# Patient Record
Sex: MALE | Race: WHITE | NOT HISPANIC OR LATINO
[De-identification: names, ages, dates, MRNs, and addresses within clinical notes are randomized per-mention and may not be internally consistent; named-entity substitution may affect disease eponyms.]

---

## 2019-07-10 ENCOUNTER — APPOINTMENT (OUTPATIENT)
Dept: CARDIOLOGY | Facility: CLINIC | Age: 64
End: 2019-07-10
Payer: MEDICARE

## 2019-07-10 ENCOUNTER — NON-APPOINTMENT (OUTPATIENT)
Age: 64
End: 2019-07-10

## 2019-07-10 VITALS
RESPIRATION RATE: 14 BRPM | DIASTOLIC BLOOD PRESSURE: 78 MMHG | WEIGHT: 172 LBS | HEIGHT: 70 IN | HEART RATE: 75 BPM | SYSTOLIC BLOOD PRESSURE: 124 MMHG | BODY MASS INDEX: 24.62 KG/M2

## 2019-07-10 DIAGNOSIS — Z83.438 FAMILY HISTORY OF OTHER DISORDER OF LIPOPROTEIN METABOLISM AND OTHER LIPIDEMIA: ICD-10-CM

## 2019-07-10 DIAGNOSIS — Z87.891 PERSONAL HISTORY OF NICOTINE DEPENDENCE: ICD-10-CM

## 2019-07-10 DIAGNOSIS — Z85.828 PERSONAL HISTORY OF OTHER MALIGNANT NEOPLASM OF SKIN: ICD-10-CM

## 2019-07-10 DIAGNOSIS — Z87.898 PERSONAL HISTORY OF OTHER SPECIFIED CONDITIONS: ICD-10-CM

## 2019-07-10 DIAGNOSIS — Z82.49 FAMILY HISTORY OF ISCHEMIC HEART DISEASE AND OTHER DISEASES OF THE CIRCULATORY SYSTEM: ICD-10-CM

## 2019-07-10 DIAGNOSIS — Z86.69 PERSONAL HISTORY OF OTHER DISEASES OF THE NERVOUS SYSTEM AND SENSE ORGANS: ICD-10-CM

## 2019-07-10 DIAGNOSIS — M19.90 UNSPECIFIED OSTEOARTHRITIS, UNSPECIFIED SITE: ICD-10-CM

## 2019-07-10 DIAGNOSIS — Z85.6 PERSONAL HISTORY OF LEUKEMIA: ICD-10-CM

## 2019-07-10 DIAGNOSIS — Z87.39 PERSONAL HISTORY OF OTHER DISEASES OF THE MUSCULOSKELETAL SYSTEM AND CONNECTIVE TISSUE: ICD-10-CM

## 2019-07-10 DIAGNOSIS — Z87.442 PERSONAL HISTORY OF URINARY CALCULI: ICD-10-CM

## 2019-07-10 DIAGNOSIS — Z87.19 PERSONAL HISTORY OF OTHER DISEASES OF THE DIGESTIVE SYSTEM: ICD-10-CM

## 2019-07-10 DIAGNOSIS — Z87.09 PERSONAL HISTORY OF OTHER DISEASES OF THE RESPIRATORY SYSTEM: ICD-10-CM

## 2019-07-10 PROCEDURE — 93000 ELECTROCARDIOGRAM COMPLETE: CPT

## 2019-07-10 PROCEDURE — 99204 OFFICE O/P NEW MOD 45 MIN: CPT

## 2019-07-31 ENCOUNTER — APPOINTMENT (OUTPATIENT)
Dept: CARDIOLOGY | Facility: CLINIC | Age: 64
End: 2019-07-31
Payer: MEDICARE

## 2019-07-31 PROCEDURE — 93306 TTE W/DOPPLER COMPLETE: CPT

## 2019-08-08 ENCOUNTER — APPOINTMENT (OUTPATIENT)
Dept: CARDIOLOGY | Facility: CLINIC | Age: 64
End: 2019-08-08
Payer: MEDICARE

## 2019-08-08 PROCEDURE — A9500: CPT

## 2019-08-08 PROCEDURE — 78452 HT MUSCLE IMAGE SPECT MULT: CPT

## 2019-08-08 PROCEDURE — 93015 CV STRESS TEST SUPVJ I&R: CPT

## 2019-09-05 RX ORDER — KIT FOR THE PREPARATION OF TECHNETIUM TC99M SESTAMIBI 1 MG/5ML
INJECTION, POWDER, LYOPHILIZED, FOR SOLUTION PARENTERAL
Refills: 0 | Status: COMPLETED | OUTPATIENT
Start: 2019-09-05

## 2019-09-05 RX ADMIN — KIT FOR THE PREPARATION OF TECHNETIUM TC99M SESTAMIBI 0: 1 INJECTION, POWDER, LYOPHILIZED, FOR SOLUTION PARENTERAL at 00:00

## 2019-11-15 ENCOUNTER — NON-APPOINTMENT (OUTPATIENT)
Age: 64
End: 2019-11-15

## 2019-11-15 ENCOUNTER — APPOINTMENT (OUTPATIENT)
Dept: CARDIOLOGY | Facility: CLINIC | Age: 64
End: 2019-11-15
Payer: MEDICARE

## 2019-11-15 VITALS
HEIGHT: 70 IN | BODY MASS INDEX: 23.62 KG/M2 | SYSTOLIC BLOOD PRESSURE: 123 MMHG | DIASTOLIC BLOOD PRESSURE: 70 MMHG | RESPIRATION RATE: 16 BRPM | HEART RATE: 72 BPM | WEIGHT: 165 LBS

## 2019-11-15 PROCEDURE — 93000 ELECTROCARDIOGRAM COMPLETE: CPT

## 2019-11-15 PROCEDURE — 99214 OFFICE O/P EST MOD 30 MIN: CPT

## 2019-11-15 NOTE — ASSESSMENT
[FreeTextEntry1] : EKG demonstrating normal sinus rhythm with a moderate rate of 72 and otherwise within normal limits;\par \par Recent nuclear stress test demonstrating good exercise tolerance into stage IV, no symptoms of chest pain, occasional PACs and PVCs were seen. EKG remained negative her ST abnormality. Myocardial perfusion images demonstrated normal perfusion without any evidence of ischemia-i.e. negative test\par \par Recent transthoracic echocardiogram from August 2019 demonstrates normal cardiac chamber sizes, normal systolic function of LV normal ejection fraction of 60% mild AI and TR;\par \par Plan:\par \par No additional cardiac workup indicated at this time\par \par Patient encouraged to return to some moderate physical exercise;\par \par Followup to this office within 6 months or p.r.n.\par \par Continued timely checkups and laboratory blood tests with primary care;;;;

## 2019-11-15 NOTE — REASON FOR VISIT
[Follow-Up - Clinic] : a clinic follow-up of [FreeTextEntry1] : The patient is a 63-year-old retired  who has a history of 9/11 exposure and being treated for CML with Gleevec who had a prior chest pain syndrome and underwent recent cardiac testing.;\par \par Fortunately, he has been doing well from a cardiac standpoint and has had no recent symptoms of chest pain, shortness of breath, palpitations or dizziness;\par

## 2019-11-15 NOTE — PHYSICAL EXAM
[FreeTextEntry1] : Alert, pleasant, thin white male in no acute distress [Normal Conjunctiva] : the conjunctiva exhibited no abnormalities [Eyelids - No Xanthelasma] : the eyelids demonstrated no xanthelasmas [No Oral Pallor] : no oral pallor [Normal Oral Mucosa] : normal oral mucosa [No Oral Cyanosis] : no oral cyanosis [Normal Jugular Venous V Waves Present] : normal jugular venous V waves present [Normal Jugular Venous A Waves Present] : normal jugular venous A waves present [No Jugular Venous Mitchell A Waves] : no jugular venous mitchell A waves [Respiration, Rhythm And Depth] : normal respiratory rhythm and effort [Exaggerated Use Of Accessory Muscles For Inspiration] : no accessory muscle use [Auscultation Breath Sounds / Voice Sounds] : lungs were clear to auscultation bilaterally [Heart Rate And Rhythm] : heart rate and rhythm were normal [Heart Sounds] : normal S1 and S2 [Murmurs] : no murmurs present [Abdomen Soft] : soft [Abdomen Tenderness] : non-tender [Abdomen Mass (___ Cm)] : no abdominal mass palpated [Abnormal Walk] : normal gait [Nail Clubbing] : no clubbing of the fingernails [Gait - Sufficient For Exercise Testing] : the gait was sufficient for exercise testing [Cyanosis, Localized] : no localized cyanosis [Petechial Hemorrhages (___cm)] : no petechial hemorrhages [Skin Color & Pigmentation] : normal skin color and pigmentation [No Venous Stasis] : no venous stasis [] : no rash [Skin Lesions] : no skin lesions [No Skin Ulcers] : no skin ulcer [Oriented To Time, Place, And Person] : oriented to person, place, and time [No Xanthoma] : no  xanthoma was observed [Affect] : the affect was normal [Mood] : the mood was normal [No Anxiety] : not feeling anxious

## 2019-11-15 NOTE — HISTORY OF PRESENT ILLNESS
[FreeTextEntry1] : He is on no cardiac meds at this time;\par \par He had been doing a moderate amount of physical activity and exercise but cut back because of some arthritides especially affecting the hands and wrist areas for which he has had some pain management injections and steroids;\par \par He lost 10 pounds on a low-carb diet to levi off any tendency for early diabetes;;

## 2020-05-05 ENCOUNTER — APPOINTMENT (OUTPATIENT)
Dept: CARDIOLOGY | Facility: CLINIC | Age: 65
End: 2020-05-05
Payer: MEDICARE

## 2020-05-05 PROCEDURE — 99214 OFFICE O/P EST MOD 30 MIN: CPT | Mod: 95

## 2020-05-05 NOTE — ASSESSMENT
[FreeTextEntry1] : In summary this 64-year-old gentleman who has had a stable cardiac pattern only mild valvular insufficiency on last echo negative nuclear stress test from August 2019-and stable pattern with history of CML with his oncologist;\par \par No additional cardiac workup indicated at this time;\par \par \par \par Plan:\par \par Okay to make followup visit within 5-6 months\par \par Patient report any untoward chest symptoms between now and next visit;\par \par Stay safe guidelines for this high risk patient for coronavirus pandemic discussed at length as patient appears to understand and follow guidelines;;\par ;

## 2020-05-05 NOTE — PHYSICAL EXAM
[General Appearance - Well Developed] : well developed [Normal Appearance] : normal appearance [General Appearance - In No Acute Distress] : no acute distress [General Appearance - Well Nourished] : well nourished [] : no respiratory distress [Oriented To Time, Place, And Person] : oriented to person, place, and time [Impaired Insight] : insight and judgment were intact [Affect] : the affect was normal [Memory Recent] : recent memory was not impaired [No Anxiety] : not feeling anxious

## 2020-05-05 NOTE — HISTORY OF PRESENT ILLNESS
[Home] : at home, [unfilled] , at the time of the visit. [Medical Office: ([facility])___] : at the medical office located in  [Patient] : the patient [FreeTextEntry1] : This 64-year-old white male with known history for CML and prior 9/11 exposure, who is a retired  has had a stable cardiac pattern;\par \par Patient underwent cardiac testing this past summer including nuclear stress test and echocardiogram;\par \par He reports he's been without chest pain, shortness of breath, palpitations or dizziness;\par \par Patient has been following up with oncology and has had a "stable report";\par \par Nuclear stress test from August 2020 demonstrated good exercise tolerance into stage IV. No symptoms of chest pain. No hernias. Normal EKG. An myocardial perfusion imaging was normal;\par \par \par \par

## 2020-05-08 ENCOUNTER — APPOINTMENT (OUTPATIENT)
Dept: CARDIOLOGY | Facility: CLINIC | Age: 65
End: 2020-05-08

## 2020-10-30 ENCOUNTER — NON-APPOINTMENT (OUTPATIENT)
Age: 65
End: 2020-10-30

## 2020-10-30 ENCOUNTER — APPOINTMENT (OUTPATIENT)
Dept: CARDIOLOGY | Facility: CLINIC | Age: 65
End: 2020-10-30
Payer: MEDICARE

## 2020-10-30 VITALS
HEIGHT: 70 IN | RESPIRATION RATE: 16 BRPM | HEART RATE: 62 BPM | BODY MASS INDEX: 23.62 KG/M2 | WEIGHT: 165 LBS | TEMPERATURE: 96.6 F | DIASTOLIC BLOOD PRESSURE: 82 MMHG | SYSTOLIC BLOOD PRESSURE: 126 MMHG

## 2020-10-30 PROCEDURE — 99214 OFFICE O/P EST MOD 30 MIN: CPT

## 2020-10-30 PROCEDURE — 93000 ELECTROCARDIOGRAM COMPLETE: CPT

## 2020-10-30 NOTE — PHYSICAL EXAM
[General Appearance - Well Developed] : well developed [Normal Appearance] : normal appearance [General Appearance - Well Nourished] : well nourished [General Appearance - In No Acute Distress] : no acute distress [Normal Conjunctiva] : the conjunctiva exhibited no abnormalities [Normal Oral Mucosa] : normal oral mucosa [Normal Oropharynx] : normal oropharynx [Normal Jugular Venous A Waves Present] : normal jugular venous A waves present [Normal Jugular Venous V Waves Present] : normal jugular venous V waves present [No Jugular Venous Mitchell A Waves] : no jugular venous mitchell A waves [] : no respiratory distress [Respiration, Rhythm And Depth] : normal respiratory rhythm and effort [Auscultation Breath Sounds / Voice Sounds] : lungs were clear to auscultation bilaterally [Heart Rate And Rhythm] : heart rate and rhythm were normal [Heart Sounds] : normal S1 and S2 [Murmurs] : no murmurs present [Edema] : no peripheral edema present [Bowel Sounds] : normal bowel sounds [Abdomen Soft] : soft [Abnormal Walk] : normal gait [Nail Clubbing] : no clubbing of the fingernails [Cyanosis, Localized] : no localized cyanosis [Skin Color & Pigmentation] : normal skin color and pigmentation [Skin Turgor] : normal skin turgor [Oriented To Time, Place, And Person] : oriented to person, place, and time [Impaired Insight] : insight and judgment were intact [Affect] : the affect was normal [No Anxiety] : not feeling anxious

## 2020-10-30 NOTE — ASSESSMENT
[FreeTextEntry1] : EKG 10/30/2020:  The EKG illustrates sinus rhythm, rate of 62 bpm, short [place] syndrome, early R wave transition V1 to V2. \par \par In summary this 64-year-old gentleman who has had a stable cardiac pattern only mild valvular insufficiency on last echo negative nuclear stress test from August 2019-and stable pattern with history of CML with his oncologist;

## 2020-10-30 NOTE — REASON FOR VISIT
[FreeTextEntry1] : MINESH HIGGINS is being seen for a clinic follow-up of. \par \par The patient is a 64-year-old retired  who has a history of 9/11 exposure and being treated for CML with Gleevec (400 mg BID) who had a prior chest pain syndrome.  \par \par Fortunately, he has been doing well from a cardiac standpoint and has had no recent symptoms of chest pain, shortness of breath, palpitations or dizziness.

## 2020-10-30 NOTE — DISCUSSION/SUMMARY
[FreeTextEntry1] : 1).  Patient will complete a Transthoracic Echocardiogram prior to follow up office visit to assess LV systolic function, valvulopathy and for signs of cardiomyopathy.\par \par 2).  Follow up with PCP and Oncologist regarding routine checkups and blood work, have copy faxed to our office. \par \par 3).  Continue with diet and lifestyle modification including low fat and low carbohydrate diet.  He is to implement modest aerobic exercise regimen few days per week.\par \par 4).  Recommend patient report any untoward symptoms. \par \par 5).  Follow up with our office in 5 to 6 months or PRN.

## 2021-02-03 ENCOUNTER — APPOINTMENT (OUTPATIENT)
Dept: CARDIOLOGY | Facility: CLINIC | Age: 66
End: 2021-02-03
Payer: MEDICARE

## 2021-02-03 PROCEDURE — 93306 TTE W/DOPPLER COMPLETE: CPT

## 2021-03-12 ENCOUNTER — APPOINTMENT (OUTPATIENT)
Dept: CARDIOLOGY | Facility: CLINIC | Age: 66
End: 2021-03-12
Payer: MEDICARE

## 2021-03-12 ENCOUNTER — NON-APPOINTMENT (OUTPATIENT)
Age: 66
End: 2021-03-12

## 2021-03-12 VITALS
BODY MASS INDEX: 24.34 KG/M2 | HEIGHT: 70 IN | TEMPERATURE: 97.3 F | WEIGHT: 170 LBS | HEART RATE: 79 BPM | RESPIRATION RATE: 16 BRPM | DIASTOLIC BLOOD PRESSURE: 80 MMHG | SYSTOLIC BLOOD PRESSURE: 128 MMHG

## 2021-03-12 DIAGNOSIS — Z00.00 ENCOUNTER FOR GENERAL ADULT MEDICAL EXAMINATION W/OUT ABNORMAL FINDINGS: ICD-10-CM

## 2021-03-12 PROCEDURE — 99214 OFFICE O/P EST MOD 30 MIN: CPT

## 2021-03-12 PROCEDURE — 93000 ELECTROCARDIOGRAM COMPLETE: CPT

## 2021-03-12 NOTE — REASON FOR VISIT
[Follow-Up - Clinic] : a clinic follow-up of [FreeTextEntry1] : The patient is a 65-year-old white male with known history for CML currently on Gleevec medication who has a prior history of chest pain syndrome but doing well lately from a cardiac standpoint. He returns to the office for general cardiac checkup today;\par \par Patient reports that he has been without symptoms for chest pain, shortness of breath, palpitations, dizziness or syncope;\par

## 2021-03-12 NOTE — HISTORY OF PRESENT ILLNESS
[FreeTextEntry1] : He states he completed his vaccine for Covid 19; he has been doing a modest amount of physical exercise; \par \par He currently is on no cardiac meds;\par \par Last nuclear stress test was August 2019 and was negative for ischemia on the myocardial perfusion images;

## 2021-03-12 NOTE — ASSESSMENT
[FreeTextEntry1] : EKG demonstrating normal sinus rhythm at a rate of 79. Early R-wave transition V1 to V2. No acute changes\par \par In summary this 65-year-old gentleman has had a stable cardiac pattern with a remote history for chest pain in the past but negative nuclear stress test and echocardiogram ( showing preserved cardiac chamber sizes and normal systolic function of left ventricle and mild AI and TR);\par \par Plan:\par \par No additional cardiac workup indicated at this time;;Followup in this office within 6 months or p.r.n.;\par \par Continued timely checkups and laboratory blood tests with primary care encouraged;

## 2021-03-12 NOTE — PHYSICAL EXAM
[General Appearance - Well Developed] : well developed [Normal Appearance] : normal appearance [General Appearance - Well Nourished] : well nourished [General Appearance - In No Acute Distress] : no acute distress [Normal Conjunctiva] : the conjunctiva exhibited no abnormalities [Eyelids - No Xanthelasma] : the eyelids demonstrated no xanthelasmas [Normal Oral Mucosa] : normal oral mucosa [No Oral Pallor] : no oral pallor [No Oral Cyanosis] : no oral cyanosis [Normal Jugular Venous A Waves Present] : normal jugular venous A waves present [Normal Jugular Venous V Waves Present] : normal jugular venous V waves present [No Jugular Venous Mitchell A Waves] : no jugular venous mitchell A waves [Heart Rate And Rhythm] : heart rate and rhythm were normal [Heart Sounds] : normal S1 and S2 [Murmurs] : no murmurs present [Abdomen Soft] : soft [Abdomen Tenderness] : non-tender [Abdomen Mass (___ Cm)] : no abdominal mass palpated [Abnormal Walk] : normal gait [Gait - Sufficient For Exercise Testing] : the gait was sufficient for exercise testing [Nail Clubbing] : no clubbing of the fingernails [Cyanosis, Localized] : no localized cyanosis [Petechial Hemorrhages (___cm)] : no petechial hemorrhages [Skin Color & Pigmentation] : normal skin color and pigmentation [] : no rash [No Venous Stasis] : no venous stasis [Skin Lesions] : no skin lesions [No Skin Ulcers] : no skin ulcer [No Xanthoma] : no  xanthoma was observed [Oriented To Time, Place, And Person] : oriented to person, place, and time [Impaired Insight] : insight and judgment were intact [Affect] : the affect was normal [Memory Recent] : recent memory was not impaired [No Anxiety] : not feeling anxious

## 2021-08-06 ENCOUNTER — APPOINTMENT (OUTPATIENT)
Dept: CARDIOLOGY | Facility: CLINIC | Age: 66
End: 2021-08-06

## 2021-09-08 ENCOUNTER — NON-APPOINTMENT (OUTPATIENT)
Age: 66
End: 2021-09-08

## 2021-09-08 ENCOUNTER — APPOINTMENT (OUTPATIENT)
Dept: ORTHOPEDIC SURGERY | Facility: CLINIC | Age: 66
End: 2021-09-08
Payer: MEDICARE

## 2021-09-08 VITALS
WEIGHT: 173 LBS | DIASTOLIC BLOOD PRESSURE: 76 MMHG | HEART RATE: 77 BPM | HEIGHT: 70 IN | SYSTOLIC BLOOD PRESSURE: 132 MMHG | BODY MASS INDEX: 24.77 KG/M2

## 2021-09-08 PROCEDURE — 73030 X-RAY EXAM OF SHOULDER: CPT | Mod: LT

## 2021-09-08 PROCEDURE — 99204 OFFICE O/P NEW MOD 45 MIN: CPT

## 2021-09-08 NOTE — DISCUSSION/SUMMARY
[de-identified] : Assessment: 65-year-old male with left shoulder pain secondary to rotator cuff tendinitis and bursitis\par \par Plan: I had a long discussion with the patient today regarding the nature of their diagnosis and treatment plan. We discussed the risks and benefits of no treatment as well as nonoperative and operative treatments.  I reviewed the patient's x-rays today with him in the office which are negative for any acute pathology.  At this time I recommend conservative treatment of the patient's condition with modalities including rest, ice, heat, anti-inflammatory medications, activity modifications, and home stretching and strengthening exercises daily.  A prescription for meloxicam was sent to the patient's pharmacy today.  I discussed with the patient the risks and benefits associated with NSAID use.  A referral for physical therapy was provided to begin working on exercises for his shoulders to help improve his pain and his function.  Recommend follow-up in 8 weeks for repeat clinical evaluation.  The patient continues to have pain at that time we will consider either an MRI or an injection.\par \par The patient verbalizes their understanding and agrees with the plan.  All questions were answered to their satisfaction.

## 2021-09-08 NOTE — PHYSICAL EXAM
[de-identified] : General:\par Awake, alert, no acute distress, Patient was cooperative and appropriate during the examination.\par \par The patient is of normal weight for height and age.\par \par Walks without an antalgic gait.\par \par Full, painless range of motion of the neck and back.\par \par Exam of the bilateral lower extremities is intact and symmetric with regards to dermatologic, vascular, and neurologic exam. Bilateral lower extremity sensation is grossly intact to light touch in the DP/SP/T/S/S nerve distributions. Intact DF/PF/EHL. BIlateral lower extremities warm and well-perfused with brisk capillary refill.\par \par \par Pulmonary:\par Regular, nonlabored breathing\par \par Abdomen:\par Soft, nontender, nondistended.\par \par Lymphatic:\par No evidence of axillary lymphadenopathy\par \par Left shoulder Exam:\par Physical exam of the shoulder demonstrates normal skin without signs of skin changes or abnormalities. No erythema, warmth, or joint effusion appreciated. \par \par Sensation intact light touch C5-T1\par Palpable radial pulse\par Radial/ulnar/median/axillary/musculocutaneous/AIN/PIN nerves grossly intact\par \par Range of motion:\par Forward Flexion: 175\par Abduction: 150\par External Rotation: 45\par Internal Rotation: L1\par \par Palpation:\par Not tender to palpation over the glenohumeral joint\par Moderately tender palpation over the rotator cuff insertion on the greater tuberosity\par Not tender to palpation over the AC joint\par Mildly tender to palpation of the biceps tendon/bicipital groove\par \par Strength testing:\par Supraspinatus: 5/5\par Infraspinatus: 5/5\par Subscapularis: 5/5\par \par Special test:\par Empty can test positive\par Jacome impingement test positive\par Speeds test negative\par Melville's test negative\par Lift-off test negative\par Bell-press test negative\par Cross-arm adduction test negative\par \par  [de-identified] : X-rays including 4 views of the left shoulder were obtained in the office and reviewed the patient today.  There is no acute fracture or dislocation.  There is no significant arthritis.

## 2021-09-08 NOTE — HISTORY OF PRESENT ILLNESS
[de-identified] : Ankur is a pleasant 65-year-old left-hand-dominant male presents to the office today complaining of left shoulder pain.  The patient states that his pain began several months ago but he denies any history of trauma.  The pain is activity related and alleviated by rest.  It hurts to reach overhead or behind him.  Hurts when he sleeps on his left side at night.  He is not taking any for pain.  He has had no therapy or injections before.  The patient denies any fevers, chills, sweats, recent illnesses, numbness, tingling, weakness, or pain elsewhere at this time.

## 2021-09-27 ENCOUNTER — RX RENEWAL (OUTPATIENT)
Age: 66
End: 2021-09-27

## 2021-11-03 ENCOUNTER — APPOINTMENT (OUTPATIENT)
Dept: ORTHOPEDIC SURGERY | Facility: CLINIC | Age: 66
End: 2021-11-03

## 2022-03-09 NOTE — HISTORY OF PRESENT ILLNESS
[FreeTextEntry1] : He is on NO cardiac meds at this time;\par \par Patient reports eating a well balanced diet low in fats and carbohydrate but admits he has not been participating in any formal aerobic exercise regimen;\par \par He underwent Nuclear Stress test August 2019 which demonstrated good exercise tolerance into stage IV.  No symptoms of chest pain. Normal EKG.  Myocardial perfusion imaging was normal; Yes

## 2024-01-29 ENCOUNTER — APPOINTMENT (OUTPATIENT)
Dept: CARDIOLOGY | Facility: CLINIC | Age: 69
End: 2024-01-29
Payer: MEDICARE

## 2024-01-29 VITALS
BODY MASS INDEX: 24.2 KG/M2 | HEIGHT: 70 IN | DIASTOLIC BLOOD PRESSURE: 84 MMHG | WEIGHT: 169 LBS | HEART RATE: 62 BPM | RESPIRATION RATE: 16 BRPM | SYSTOLIC BLOOD PRESSURE: 152 MMHG

## 2024-01-29 DIAGNOSIS — M25.512 PAIN IN LEFT SHOULDER: ICD-10-CM

## 2024-01-29 PROCEDURE — 93000 ELECTROCARDIOGRAM COMPLETE: CPT

## 2024-01-29 PROCEDURE — 99214 OFFICE O/P EST MOD 30 MIN: CPT

## 2024-01-29 RX ORDER — IMATINIB MESYLATE 100 MG
CAPSULE ORAL
Refills: 0 | Status: DISCONTINUED | COMMUNITY
End: 2024-01-29

## 2024-01-29 RX ORDER — MELOXICAM 15 MG/1
15 TABLET ORAL
Qty: 21 | Refills: 0 | Status: DISCONTINUED | COMMUNITY
Start: 2021-09-08 | End: 2024-01-29

## 2024-01-29 RX ORDER — RABEPRAZOLE SODIUM 20 MG/1
20 TABLET, DELAYED RELEASE ORAL
Refills: 0 | Status: DISCONTINUED | COMMUNITY
End: 2024-01-29

## 2024-01-29 NOTE — ASSESSMENT
[FreeTextEntry1] : EKG demonstrating normal sinus rhythm at a rate of 62. Early R-wave transition V1 to V2. No acute changes  In summary this 68-year-old gentleman with prior history for chest pain in the past ; echocardiogram ( showing preserved cardiac chamber sizes and normal systolic function of left ventricle and mild AI and TR); Stress test some 5 years ago was negative at that time;  Recently in remission off of medication for CML; Mild systolic hypertension noted today and patient reports that he did have heavily salted food watching football game last night; but reports usually blood pressure is better;  Plan:  Recommend patient schedule nuclear stress test sometime in the near future to rule out any significant obstructive coronary disease and clear patient to exercise;  Patient to report any untoward chest symptoms between now and next visit;  Agree with continuing statin medication with watch for LDL cholesterol at levels of 100 or well below if possible;  Continue regular checkups and laboratory blood tests including lipid panel with primary care;  Periodic check on blood pressure readings and patient to increase H2O today and be more cognizant of low-sodium diet;

## 2024-01-29 NOTE — PHYSICAL EXAM
[General Appearance - Well Developed] : well developed [Normal Appearance] : normal appearance [General Appearance - In No Acute Distress] : no acute distress [General Appearance - Well Nourished] : well nourished [Normal Conjunctiva] : the conjunctiva exhibited no abnormalities [Eyelids - No Xanthelasma] : the eyelids demonstrated no xanthelasmas [Normal Oral Mucosa] : normal oral mucosa [No Oral Pallor] : no oral pallor [No Oral Cyanosis] : no oral cyanosis [Normal Jugular Venous A Waves Present] : normal jugular venous A waves present [Normal Jugular Venous V Waves Present] : normal jugular venous V waves present [No Jugular Venous Mitchell A Waves] : no jugular venous mitchell A waves [Heart Rate And Rhythm] : heart rate and rhythm were normal [Heart Sounds] : normal S1 and S2 [Murmurs] : no murmurs present [Abdomen Soft] : soft [Abdomen Tenderness] : non-tender [Abdomen Mass (___ Cm)] : no abdominal mass palpated [Abnormal Walk] : normal gait [Gait - Sufficient For Exercise Testing] : the gait was sufficient for exercise testing [Nail Clubbing] : no clubbing of the fingernails [Cyanosis, Localized] : no localized cyanosis [Petechial Hemorrhages (___cm)] : no petechial hemorrhages [Skin Color & Pigmentation] : normal skin color and pigmentation [] : no rash [No Venous Stasis] : no venous stasis [Skin Lesions] : no skin lesions [No Skin Ulcers] : no skin ulcer [No Xanthoma] : no  xanthoma was observed [Oriented To Time, Place, And Person] : oriented to person, place, and time [Impaired Insight] : insight and judgment were intact [Affect] : the affect was normal [Memory Recent] : recent memory was not impaired [No Anxiety] : not feeling anxious

## 2024-01-29 NOTE — HISTORY OF PRESENT ILLNESS
[FreeTextEntry1] : He was recommended to start rosuvastatin 10 mg daily with primary care because of the findings on calcium scoring and to lower his serum cholesterol;   He would like to get back into a regular exercise program and states over the past few years he is slacked off;

## 2024-01-29 NOTE — REASON FOR VISIT
[Follow-Up - Clinic] : a clinic follow-up of [FreeTextEntry1] : The patient is a 68-year-old white male with known history for CML who was previously on Gleevec, and then ultimately switched to Sprycel for a few years and then recently because of evidence of "remission", he was taken off of the Sprycel and just being watched clinically and followed by oncology;  Recently, he was recommended to undergo a CT calcium scoring to rule out any significant risk of coronary artery disease.  The patient had some intermittent chest pain at times in the past; The CT calcium score came out fairly low with a total score of 33.5 in the LAD vessel and 0 on the left main, circumflex, and RCA vessels; (low);  He returns to the office today to assess those findings and in conjunction with positive family history for cerebral stroke for a younger brother and his father who has been treated for atrial fibrillation.;  Today, he denies any significant chest pain, shortness of breath, palpitations or dizziness;

## 2024-04-23 ENCOUNTER — APPOINTMENT (OUTPATIENT)
Dept: CARDIOLOGY | Facility: CLINIC | Age: 69
End: 2024-04-23
Payer: MEDICARE

## 2024-04-23 PROCEDURE — 78452 HT MUSCLE IMAGE SPECT MULT: CPT

## 2024-04-23 PROCEDURE — A9500: CPT

## 2024-04-23 PROCEDURE — 93015 CV STRESS TEST SUPVJ I&R: CPT

## 2024-05-03 ENCOUNTER — NON-APPOINTMENT (OUTPATIENT)
Age: 69
End: 2024-05-03

## 2024-05-03 ENCOUNTER — APPOINTMENT (OUTPATIENT)
Dept: CARDIOLOGY | Facility: CLINIC | Age: 69
End: 2024-05-03
Payer: MEDICARE

## 2024-05-03 VITALS
HEIGHT: 70 IN | DIASTOLIC BLOOD PRESSURE: 78 MMHG | SYSTOLIC BLOOD PRESSURE: 140 MMHG | RESPIRATION RATE: 16 BRPM | HEART RATE: 63 BPM | BODY MASS INDEX: 25.77 KG/M2 | WEIGHT: 180 LBS

## 2024-05-03 DIAGNOSIS — R07.9 CHEST PAIN, UNSPECIFIED: ICD-10-CM

## 2024-05-03 DIAGNOSIS — R94.39 ABNORMAL RESULT OF OTHER CARDIOVASCULAR FUNCTION STUDY: ICD-10-CM

## 2024-05-03 PROCEDURE — 93000 ELECTROCARDIOGRAM COMPLETE: CPT

## 2024-05-03 PROCEDURE — 99214 OFFICE O/P EST MOD 30 MIN: CPT

## 2024-05-03 NOTE — REASON FOR VISIT
[Follow-Up - Clinic] : a clinic follow-up of [FreeTextEntry1] : The patient is a 68-year-old white male with known history for CML who was previously on Gleevec, and then ultimately switched to Sprycel for a few years and then recently because of evidence of "remission", he was taken off of the Sprycel and just being watched clinically and followed by oncology;  Recently, he was recommended to undergo a CT calcium scoring to rule out any significant risk of coronary artery disease.  The patient had some intermittent chest pain at times in the past; The CT calcium score came out fairly low with a total score of 33.5 in the LAD vessel and 0 on the left main, circumflex, and RCA vessels; (low);  He returns to the office today to review the results from follow up nuclear stress testing in light of CT calcium score, symptoms, and risk factors.    To assess those findings and in conjunction with positive family history for cerebral stroke for a younger brother and his father who has been treated for atrial fibrillation.;  Today, he continues to experience occasional exertional chest discomfort and increased dyspnea on exertion if he really pushes himself. Otherwise, he denies any significant diaphoresis, orthopnea, PND, palpitations or dizziness;

## 2024-05-03 NOTE — HISTORY OF PRESENT ILLNESS
[FreeTextEntry1] : He was recommended to start rosuvastatin 10 mg daily with primary care because of the findings on calcium scoring and to lower his serum cholesterol.  He was also started on Valsartan 160 mg daily due to uncontrolled hypertension these last few months.  Admits he forgot to take his medication this morning and today's blood pressure is 140/80.    He would like to get back into a regular exercise program and states over the past few years he is slacked off;  Most recent Nuclear Stress Test (4/23/2024):  The myocardial perfusion imaging was abnormal, with small-sized, moderate defect in the apical and apical inferior walls that are reversible consistent with ischemia. There were no ischemic ST segment changes on EKG. The findings are now consistent with ischemia when compared to prior study dated (2019); ie- positive study.

## 2024-05-03 NOTE — PHYSICAL EXAM
[General Appearance - Well Developed] : well developed [Normal Appearance] : normal appearance [General Appearance - Well Nourished] : well nourished [General Appearance - In No Acute Distress] : no acute distress [Normal Conjunctiva] : the conjunctiva exhibited no abnormalities [Eyelids - No Xanthelasma] : the eyelids demonstrated no xanthelasmas [Normal Oral Mucosa] : normal oral mucosa [No Oral Pallor] : no oral pallor [No Oral Cyanosis] : no oral cyanosis [Normal Jugular Venous A Waves Present] : normal jugular venous A waves present [Normal Jugular Venous V Waves Present] : normal jugular venous V waves present [No Jugular Venous Mitchell A Waves] : no jugular venous mitchell A waves [Heart Rate And Rhythm] : heart rate and rhythm were normal [Heart Sounds] : normal S1 and S2 [Murmurs] : no murmurs present [Abdomen Soft] : soft [Abdomen Tenderness] : non-tender [Abdomen Mass (___ Cm)] : no abdominal mass palpated [Abnormal Walk] : normal gait [Gait - Sufficient For Exercise Testing] : the gait was sufficient for exercise testing [Nail Clubbing] : no clubbing of the fingernails [Cyanosis, Localized] : no localized cyanosis [Petechial Hemorrhages (___cm)] : no petechial hemorrhages [Skin Color & Pigmentation] : normal skin color and pigmentation [No Venous Stasis] : no venous stasis [] : no rash [Skin Lesions] : no skin lesions [No Skin Ulcers] : no skin ulcer [No Xanthoma] : no  xanthoma was observed [Oriented To Time, Place, And Person] : oriented to person, place, and time [Impaired Insight] : insight and judgment were intact [Affect] : the affect was normal [Memory Recent] : recent memory was not impaired [No Anxiety] : not feeling anxious

## 2024-05-03 NOTE — ASSESSMENT
[FreeTextEntry1] : EKG demonstrating normal sinus rhythm at a rate of 63. Slight ST elevations inferior leads.   In summary this 68-year-old gentleman with prior history for chest pain in the past that he continues to notice with some worsening MACHADO; echocardiogram ( showing preserved cardiac chamber sizes and normal systolic function of left ventricle and mild AI and TR); Stress test some 5 years ago was negative at that time;  Recently in remission off of medication for CML; Mild systolic hypertension noted today but admits to not taking his Valsartan 160 mg this morning.  Most recent nuclear stress test positive for reversible ischemia which appears worse than his prior study dated back in (2019).      Plan:  Recommend patient undergo a left heart catheterization in the near future to rule out any significant obstructive coronary disease and clear patient to exercise;  Patient to report any untoward chest symptoms between now and next visit;  Agree with continuing statin medication with watch for LDL cholesterol at levels of 100 or well below if possible;  Continue regular checkups and laboratory blood tests including lipid panel with primary care;  Periodic check on blood pressure readings and patient to increase H2O today and be more cognizant of low-sodium diet.  Continue taking Valsartan 160 mg daily as directed.   Follow up with our office within a few weeks status post cardiac catheterization or PRN.

## 2024-05-22 ENCOUNTER — OUTPATIENT (OUTPATIENT)
Dept: OUTPATIENT SERVICES | Facility: HOSPITAL | Age: 69
LOS: 1 days | End: 2024-05-22
Payer: MEDICARE

## 2024-05-22 VITALS
OXYGEN SATURATION: 98 % | HEIGHT: 70 IN | DIASTOLIC BLOOD PRESSURE: 84 MMHG | HEART RATE: 84 BPM | WEIGHT: 178.57 LBS | SYSTOLIC BLOOD PRESSURE: 142 MMHG | RESPIRATION RATE: 16 BRPM | TEMPERATURE: 97 F

## 2024-05-22 DIAGNOSIS — Z01.818 ENCOUNTER FOR OTHER PREPROCEDURAL EXAMINATION: ICD-10-CM

## 2024-05-22 LAB
ALBUMIN SERPL ELPH-MCNC: 4.4 G/DL — SIGNIFICANT CHANGE UP (ref 3.3–5.2)
ALP SERPL-CCNC: 100 U/L — SIGNIFICANT CHANGE UP (ref 40–120)
ALT FLD-CCNC: 23 U/L — SIGNIFICANT CHANGE UP
ANION GAP SERPL CALC-SCNC: 11 MMOL/L — SIGNIFICANT CHANGE UP (ref 5–17)
APTT BLD: 30.1 SEC — SIGNIFICANT CHANGE UP (ref 24.5–35.6)
AST SERPL-CCNC: 25 U/L — SIGNIFICANT CHANGE UP
BASOPHILS # BLD AUTO: 0.03 K/UL — SIGNIFICANT CHANGE UP (ref 0–0.2)
BASOPHILS NFR BLD AUTO: 0.7 % — SIGNIFICANT CHANGE UP (ref 0–2)
BILIRUB SERPL-MCNC: 0.6 MG/DL — SIGNIFICANT CHANGE UP (ref 0.4–2)
BLD GP AB SCN SERPL QL: SIGNIFICANT CHANGE UP
BUN SERPL-MCNC: 13.9 MG/DL — SIGNIFICANT CHANGE UP (ref 8–20)
CALCIUM SERPL-MCNC: 9.2 MG/DL — SIGNIFICANT CHANGE UP (ref 8.4–10.5)
CHLORIDE SERPL-SCNC: 101 MMOL/L — SIGNIFICANT CHANGE UP (ref 96–108)
CO2 SERPL-SCNC: 24 MMOL/L — SIGNIFICANT CHANGE UP (ref 22–29)
CREAT SERPL-MCNC: 1.02 MG/DL — SIGNIFICANT CHANGE UP (ref 0.5–1.3)
EGFR: 81 ML/MIN/1.73M2 — SIGNIFICANT CHANGE UP
EOSINOPHIL # BLD AUTO: 0.16 K/UL — SIGNIFICANT CHANGE UP (ref 0–0.5)
EOSINOPHIL NFR BLD AUTO: 3.5 % — SIGNIFICANT CHANGE UP (ref 0–6)
GLUCOSE SERPL-MCNC: 115 MG/DL — HIGH (ref 70–99)
HCT VFR BLD CALC: 44.9 % — SIGNIFICANT CHANGE UP (ref 39–50)
HGB BLD-MCNC: 15.4 G/DL — SIGNIFICANT CHANGE UP (ref 13–17)
IMM GRANULOCYTES NFR BLD AUTO: 0.4 % — SIGNIFICANT CHANGE UP (ref 0–0.9)
INR BLD: 0.93 RATIO — SIGNIFICANT CHANGE UP (ref 0.85–1.18)
LYMPHOCYTES # BLD AUTO: 1.07 K/UL — SIGNIFICANT CHANGE UP (ref 1–3.3)
LYMPHOCYTES # BLD AUTO: 23.6 % — SIGNIFICANT CHANGE UP (ref 13–44)
MAGNESIUM SERPL-MCNC: 2.2 MG/DL — SIGNIFICANT CHANGE UP (ref 1.6–2.6)
MCHC RBC-ENTMCNC: 31.6 PG — SIGNIFICANT CHANGE UP (ref 27–34)
MCHC RBC-ENTMCNC: 34.3 GM/DL — SIGNIFICANT CHANGE UP (ref 32–36)
MCV RBC AUTO: 92 FL — SIGNIFICANT CHANGE UP (ref 80–100)
MONOCYTES # BLD AUTO: 0.68 K/UL — SIGNIFICANT CHANGE UP (ref 0–0.9)
MONOCYTES NFR BLD AUTO: 15 % — HIGH (ref 2–14)
NEUTROPHILS # BLD AUTO: 2.57 K/UL — SIGNIFICANT CHANGE UP (ref 1.8–7.4)
NEUTROPHILS NFR BLD AUTO: 56.8 % — SIGNIFICANT CHANGE UP (ref 43–77)
PLATELET # BLD AUTO: 229 K/UL — SIGNIFICANT CHANGE UP (ref 150–400)
POTASSIUM SERPL-MCNC: 4.2 MMOL/L — SIGNIFICANT CHANGE UP (ref 3.5–5.3)
POTASSIUM SERPL-SCNC: 4.2 MMOL/L — SIGNIFICANT CHANGE UP (ref 3.5–5.3)
PROT SERPL-MCNC: 6.9 G/DL — SIGNIFICANT CHANGE UP (ref 6.6–8.7)
PROTHROM AB SERPL-ACNC: 10.3 SEC — SIGNIFICANT CHANGE UP (ref 9.5–13)
RBC # BLD: 4.88 M/UL — SIGNIFICANT CHANGE UP (ref 4.2–5.8)
RBC # FLD: 12.5 % — SIGNIFICANT CHANGE UP (ref 10.3–14.5)
SODIUM SERPL-SCNC: 136 MMOL/L — SIGNIFICANT CHANGE UP (ref 135–145)
WBC # BLD: 4.53 K/UL — SIGNIFICANT CHANGE UP (ref 3.8–10.5)
WBC # FLD AUTO: 4.53 K/UL — SIGNIFICANT CHANGE UP (ref 3.8–10.5)

## 2024-05-22 PROCEDURE — 85610 PROTHROMBIN TIME: CPT

## 2024-05-22 PROCEDURE — 85025 COMPLETE CBC W/AUTO DIFF WBC: CPT

## 2024-05-22 PROCEDURE — 83735 ASSAY OF MAGNESIUM: CPT

## 2024-05-22 PROCEDURE — 86850 RBC ANTIBODY SCREEN: CPT

## 2024-05-22 PROCEDURE — 93005 ELECTROCARDIOGRAM TRACING: CPT

## 2024-05-22 PROCEDURE — 86901 BLOOD TYPING SEROLOGIC RH(D): CPT

## 2024-05-22 PROCEDURE — 80053 COMPREHEN METABOLIC PANEL: CPT

## 2024-05-22 PROCEDURE — 36415 COLL VENOUS BLD VENIPUNCTURE: CPT

## 2024-05-22 PROCEDURE — 93010 ELECTROCARDIOGRAM REPORT: CPT

## 2024-05-22 PROCEDURE — G0463: CPT

## 2024-05-22 PROCEDURE — 86900 BLOOD TYPING SEROLOGIC ABO: CPT

## 2024-05-22 PROCEDURE — 85730 THROMBOPLASTIN TIME PARTIAL: CPT

## 2024-05-22 NOTE — H&P PST ADULT - ASSESSMENT
Risk Assessments:  ASA:  Mallampati:  Creat:   GFR:   BRA:      Indication:     Coronary Anatomy:     Plan:    -plan for *** via ***  -preferred access:   -confirmed appropriate NPO duration  -ECG and Labs reviewed  -Aspirin 81mg po pre-cath  -Normal Saline 0.9%  250ml/hr IV: pre procedure ANA ppx   -procedure discussed with patient; risks and benefits explained, questions answered  -consent obtained by attending  Risk Assessments:  ASA:  Mallampati:  Creat:   GFR:   BRA:      Plan: The patient is a 68-year-old white male with known history for CML in remission, GERD, and mild CAD on recent CCTA. He c/o intermittent progressive MACHADO and chest pain with abnormal stress test concerning for apical inferior ischemia. Now present to presurgical testing for upcoming LHC on 5/29 with Dr. Booth to further assess CAD.        -plan for LHC   -preferred access: RRA  -confirmed appropriate NPO duration  -ECG and Labs reviewed  -Aspirin 81mg po pre-cath  -Normal Saline 0.9%  250ml/hr IV: pre procedure ANA ppx   -procedure discussed with patient; risks and benefits explained, questions answered  -consent obtained by attending  Risk Assessments:  ASA: 2  Mallampati: 2  Creat:   GFR:   BRA:      Plan: The patient is a 68-year-old white male with known history for CML in remission, GERD, and mild CAD on recent CCTA. He c/o intermittent progressive MACHADO and chest pain with abnormal stress test concerning for apical inferior ischemia. Now present to presurgical testing for upcoming LHC on 5/29 with Dr. Booth to further assess CAD.        -plan for LHC   -preferred access: RRA  -confirmed appropriate NPO duration  -ECG and Labs reviewed  -Aspirin 81mg po pre-cath  -Normal Saline 0.9%  250ml/hr IV: pre procedure ANA ppx   -procedure discussed with patient; risks and benefits explained, questions answered  -consent obtained by attending  Risk Assessments:  ASA: 2  Mallampati: 2  Creat: 1.02  GFR: 81  BRA: 1.0%      Plan: The patient is a 68-year-old white male with known history for CML in remission, GERD, and mild CAD on recent CCTA. He c/o intermittent progressive MACHADO and chest pain with abnormal stress test concerning for apical inferior ischemia. Now present to presurgical testing for upcoming LHC on 5/29 with Dr. Booth to further assess CAD.        -plan for [facility]   -preferred access: RRA  -confirmed appropriate NPO duration  -ECG and Labs reviewed  -Aspirin 81mg po pre-cath  -Normal Saline 0.9%  250ml/hr IV: pre procedure ANA ppx   -procedure discussed with patient; risks and benefits explained, questions answered  -consent obtained by attending

## 2024-05-22 NOTE — H&P PST ADULT - HISTORY OF PRESENT ILLNESS
HPI: The patient is a 68-year-old white male with known history for CML in remission, GERD, and mild CAD on recent CCTA. He c/o intermittent progressive MACHADO and chest pain with abnormal stress test concerning for apical inferior ischemia. Now present to presurgical testing for upcoming [facility] on 5/29 with Dr. Booth to further assess CAD.    Summary   The CT calcium score came out fairly low with a total score of 33.5 in the LAD vessel and 0 on the left main, circumflex, and RCA vessels; (low);  ?  Nuclear Stress Test (4/23/2024): The myocardial perfusion imaging was abnormal, with small-sized, moderate defect in the apical and apical inferior walls that are reversible consistent with ischemia. There were no ischemic ST segment changes on EKG. The findings are now consistent with ischemia when compared to prior study dated (2019); ie- positive study.    Symptoms:        Angina (Class):        Ischemic Symptoms:     Heart Failure:        Systolic/Diastolic/Combined:        NYHA Class (within 2 weeks):     Assessment of LVEF (Must be within 6 months):       EF:        Assessed by:        Date:     Echo (Date, Findings):     Prior Cardiac Interventions:       PCI's (Date, Stents, Vessels):        CABG (Date, Grafts):     Noninvasive Testing:   Stress Test: Date:        Protocol:        Duration of Exercise:        Symptoms:        EKG Changes:        DTS:        Myocardial Imaging:        Risk Assessment (Low, Medium, High):       Antianginal Therapies:        Beta Blockers:         Calcium Channel Blockers:        Long Acting Nitrates:        Ranexa:       Associated Risk Factors:        Fraility Assessment: N/A (mild, moderate, severe)       Cerebrovascular Disease: N/A       Chronic Lung Disease: N/A       Peripheral Arterial Disease: N/A       Chronic Kidney Disease (if yes, what is GFR): N/A       Uncontrolled Diabetes (if yes, what is HgbA1C or FBS): N/A       Poorly Controlled Hypertension (if yes, what is SBP): N/A       Morbid Obesity (if yes, what is BMI): N/A       History of Recent Ventricular Arrhythmia: N/A       Inability to Ambulate Safely: N/A       Need for Therapeutic Anticoagulation: N/A       Antiplatelet or Contrast Allergy: N/A    Social History:        Marital:         Tobacco:        ETOH:        Caffeine:     ROS:  CONSTITUTIONAL: No weakness, fevers or chills  EYES/ENT: No visual changes;  No vertigo or throat pain   NECK: No pain or stiffness  RESPIRATORY: No cough, wheezing, hemoptysis; No shortness of breath  CARDIOVASCULAR: No chest pain or palpitations  GASTROINTESTINAL: No abdominal or epigastric pain. No nausea, vomiting, or hematemesis; No diarrhea or constipation. No melena or hematochezia.  GENITOURINARY: No dysuria, frequency or hematuria  NEUROLOGICAL: No numbness or weakness  SKIN: No itching, burning, rashes, or lesions   All other review of systems is negative unless indicated above    PHYSICAL EXAM:    Vital Signs Last 24 Hrs  T(C): --  T(F): --  HR: --  BP: --  BP(mean): --  RR: --  SpO2: --        GENERAL: Pt lying comfortably, NAD.  ENMT: PERRL, +EOMI.  NECK: soft, Supple, No JVD,   CHEST/LUNG: Clear to auscultatation bilaterally; No wheezing.  HEART: S1S2+, Regular rate and rhythm; No murmurs.  ABDOMEN: Soft, Nontender, Nondistended; Bowel sounds present.  MUSCULOSKELETAL: Normal range of motion.  SKIN: No rashes or lesions.  NEURO: AAOX3, no focal deficits, no motor r sensory loss.  PSYCH: normal mood.  VASCULAR:   Radial +2 R/+2 L  Femoral +2 R/+2 L  PT +2 R/+2 L  DP +2 R/+2 L    EKG:            HPI: The patient is a 68-year-old white male with known history for CML in remission, GERD, and mild CAD on recent CCTA. He c/o intermittent progressive MACHADO and chest pain with abnormal stress test concerning for apical inferior ischemia. Now present to presurgical testing for upcoming [facility] on 5/29 with Dr. Booth to further assess CAD.    Summary   The CT calcium score came out fairly low with a total score of 33.5 in the LAD vessel and 0 on the left main, circumflex, and RCA vessels; (low);  ?  Nuclear Stress Test (4/23/2024): The myocardial perfusion imaging was abnormal, with small-sized, moderate defect in the apical and apical inferior walls that are reversible consistent with ischemia. There were no ischemic ST segment changes on EKG. The findings are now consistent with ischemia when compared to prior study dated (2019); ie- positive study.    Symptoms:        Angina (Class): II       Ischemic Symptoms: MACHADO     Heart Failure:        Systolic/Diastolic/Combined:        NYHA Class (within 2 weeks):     Assessment of LVEF (Must be within 6 months):       EF: 55-60       Assessed by: TTE        Date: 1/2024    Echo (Date, Findings): normal LVSF with wall thickness, no sig VHD    Prior Cardiac Interventions: none        PCI's (Date, Stents, Vessels):        CABG (Date, Grafts):     Noninvasive Testing:     Antianginal Therapies:        Beta Blockers:         Calcium Channel Blockers:        Long Acting Nitrates:        Ranexa:       Associated Risk Factors:        Fraility Assessment: N/A (mild, moderate, severe)       Cerebrovascular Disease: N/A       Chronic Lung Disease: N/A       Peripheral Arterial Disease: N/A       Chronic Kidney Disease (if yes, what is GFR): N/A       Uncontrolled Diabetes (if yes, what is HgbA1C or FBS): N/A       Poorly Controlled Hypertension (if yes, what is SBP): N/A       Morbid Obesity (if yes, what is BMI): N/A       History of Recent Ventricular Arrhythmia: N/A       Inability to Ambulate Safely: N/A       Need for Therapeutic Anticoagulation: N/A       Antiplatelet or Contrast Allergy: N/A    Social History:        Marital:         Tobacco:        ETOH:        Caffeine:     ROS:  CONSTITUTIONAL: No weakness, fevers or chills  EYES/ENT: No visual changes;  No vertigo or throat pain   NECK: No pain or stiffness  RESPIRATORY: No cough, wheezing, hemoptysis; No shortness of breath  CARDIOVASCULAR: No chest pain or palpitations  GASTROINTESTINAL: No abdominal or epigastric pain. No nausea, vomiting, or hematemesis; No diarrhea or constipation. No melena or hematochezia.  GENITOURINARY: No dysuria, frequency or hematuria  NEUROLOGICAL: No numbness or weakness  SKIN: No itching, burning, rashes, or lesions   All other review of systems is negative unless indicated above    PHYSICAL EXAM:    Vital Signs Last 24 Hrs  T(C): --  T(F): --  HR: --  BP: --  BP(mean): --  RR: --  SpO2: --        GENERAL: Pt lying comfortably, NAD.  ENMT: PERRL, +EOMI.  NECK: soft, Supple, No JVD,   CHEST/LUNG: Clear to auscultatation bilaterally; No wheezing.  HEART: S1S2+, Regular rate and rhythm; No murmurs.  ABDOMEN: Soft, Nontender, Nondistended; Bowel sounds present.  MUSCULOSKELETAL: Normal range of motion.  SKIN: No rashes or lesions.  NEURO: AAOX3, no focal deficits, no motor r sensory loss.  PSYCH: normal mood.  VASCULAR:   Radial +2 R/+2 L  Femoral +2 R/+2 L  PT +2 R/+2 L  DP +2 R/+2 L    EKG:            HPI: The patient is a 68-year-old white male with known history for CML in remission, GERD, and mild CAD on recent CCTA. He c/o intermittent progressive MACHADO and chest pain with abnormal stress test concerning for apical inferior ischemia. Now present to presurgical testing for upcoming [facility] on 5/29 with Dr. Booth to further assess CAD.    Summary   The CT calcium score came out fairly low with a total score of 33.5 in the LAD vessel and 0 on the left main, circumflex, and RCA vessels; (low);  ?  Nuclear Stress Test (4/23/2024): The myocardial perfusion imaging was abnormal, with small-sized, moderate defect in the apical and apical inferior walls that are reversible consistent with ischemia. There were no ischemic ST segment changes on EKG. The findings are now consistent with ischemia when compared to prior study dated (2019); ie- positive study.    Symptoms:        Angina (Class): II       Ischemic Symptoms: MACHADO     Heart Failure: no       Systolic/Diastolic/Combined:        NYHA Class (within 2 weeks):     Assessment of LVEF (Must be within 6 months):       EF: 55-60       Assessed by: TTE        Date: 1/2024    Echo (Date, Findings): normal LVSF with wall thickness, no sig VHD    Prior Cardiac Interventions: none        PCI's (Date, Stents, Vessels):        CABG (Date, Grafts):       Antianginal Therapies:        Beta Blockers:         Calcium Channel Blockers:        Long Acting Nitrates:        Ranexa:       Associated Risk Factors:        Fraility Assessment: N/A (mild, moderate, severe)       Cerebrovascular Disease: N/A       Chronic Lung Disease: N/A       Peripheral Arterial Disease: N/A       Chronic Kidney Disease (if yes, what is GFR): N/A       Uncontrolled Diabetes (if yes, what is HgbA1C or FBS): N/A       Poorly Controlled Hypertension (if yes, what is SBP): N/A       Morbid Obesity (if yes, what is BMI): N/A       History of Recent Ventricular Arrhythmia: N/A       Inability to Ambulate Safely: N/A       Need for Therapeutic Anticoagulation: N/A       Antiplatelet or Contrast Allergy: N/A    Social History:        Marital:  single retired         Tobacco: 15 pk years, quit 1995       ETOH: 2-3 beers weekend     ROS:  CONSTITUTIONAL: No weakness, fevers or chills  EYES/ENT: No visual changes;  No vertigo or throat pain   NECK: No pain or stiffness  RESPIRATORY: No cough, wheezing, hemoptysis; No shortness of breath  CARDIOVASCULAR: No chest pain or palpitations  GASTROINTESTINAL: No abdominal or epigastric pain. No nausea, vomiting, or hematemesis; No diarrhea or constipation. No melena or hematochezia.  GENITOURINARY: No dysuria, frequency or hematuria  NEUROLOGICAL: No numbness or weakness  SKIN: No itching, burning, rashes, or lesions   All other review of systems is negative unless indicated above    PHYSICAL EXAM:    GENERAL: Pt lying comfortably, NAD.  ENMT: PERRL, +EOMI.  NECK: soft, Supple, No JVD,   CHEST/LUNG: Clear to auscultation bilaterally; No wheezing.  HEART: S1S2+, Regular rate and rhythm; No murmurs.  ABDOMEN: Soft, Nontender, Nondistended; Bowel sounds present.  MUSCULOSKELETAL: Normal range of motion.  SKIN: No rashes or lesions.  NEURO: AAOX3, no focal deficits, no motor r sensory loss.  PSYCH: normal mood.  VASCULAR:   Radial +2 R/+2 L  Femoral +2 R/+2 L  PT +2 R/+2 L  DP +2 R/+2 L    EKG: NSR 68 bpm           HPI: The patient is a 68-year-old white male with known history for CML in remission, GERD, and mild CAD on recent CCTA. He c/o intermittent progressive MACHADO and chest pain with abnormal stress test concerning for apical inferior ischemia. Now present to presurgical testing for upcoming [facility] on 5/29 with Dr. Booth to further assess CAD.    Summary   The CT calcium score came out fairly low with a total score of 33.5 in the LAD vessel and 0 on the left main, circumflex, and RCA vessels; (low);  ?  Nuclear Stress Test (4/23/2024): The myocardial perfusion imaging was abnormal, with small-sized, moderate defect in the apical and apical inferior walls that are reversible consistent with ischemia. There were no ischemic ST segment changes on EKG. The findings are now consistent with ischemia when compared to prior study dated (2019); ie- positive study.    Symptoms:        Angina (Class): II       Ischemic Symptoms: MACHADO     Heart Failure: no       Systolic/Diastolic/Combined:        NYHA Class (within 2 weeks):     Assessment of LVEF (Must be within 6 months):       EF: 55-60       Assessed by: TTE        Date: 1/2024    Echo (Date, Findings): normal LVSF with wall thickness, no sig VHD    Prior Cardiac Interventions: none        PCI's (Date, Stents, Vessels):        CABG (Date, Grafts):       Antianginal Therapies:        Beta Blockers:         Calcium Channel Blockers:        Long Acting Nitrates:        Ranexa:       Associated Risk Factors:        Fraility Assessment: N/A (mild, moderate, severe)       Cerebrovascular Disease: N/A       Chronic Lung Disease: N/A       Peripheral Arterial Disease: N/A       Chronic Kidney Disease (if yes, what is GFR): N/A       Uncontrolled Diabetes (if yes, what is HgbA1C or FBS): N/A       Poorly Controlled Hypertension (if yes, what is SBP): N/A       Morbid Obesity (if yes, what is BMI): N/A       History of Recent Ventricular Arrhythmia: N/A       Inability to Ambulate Safely: N/A       Need for Therapeutic Anticoagulation: N/A       Antiplatelet or Contrast Allergy: N/A    Social History:        Marital:  single retired         Tobacco: 15 pk years, quit 1995       ETOH: 2-3 beers weekend     ROS:  CONSTITUTIONAL: No weakness, fevers or chills  EYES/ENT: No visual changes;  No vertigo or throat pain   NECK: No pain or stiffness  RESPIRATORY: No cough, wheezing, hemoptysis; No shortness of breath  CARDIOVASCULAR: No chest pain or palpitations  GASTROINTESTINAL: No abdominal or epigastric pain. No nausea, vomiting, or hematemesis; No diarrhea or constipation. No melena or hematochezia.  GENITOURINARY: No dysuria, frequency or hematuria  NEUROLOGICAL: No numbness or weakness  SKIN: No itching, burning, rashes, or lesions   All other review of systems is negative unless indicated above    PHYSICAL EXAM:    GENERAL: Pt lying comfortably, NAD.  ENMT: PERRL, +EOMI.  NECK: soft, Supple, No JVD,   CHEST/LUNG: Clear to auscultation bilaterally; No wheezing.  HEART: S1S2+, Regular rate and rhythm; No murmurs.  ABDOMEN: Soft, Nontender, Nondistended; Bowel sounds present.  MUSCULOSKELETAL: Normal range of motion.  SKIN: No rashes or lesions.  NEURO: AAOX3, no focal deficits, no motor r sensory loss.  PSYCH: normal mood.  VASCULAR:   Radial +2 R/+2 L  Femoral +2 R/+2 L  PT +2 R/+2 L  DP +2 R/+2 L    EKG: NSR 68 bpm                          15.4   4.53  )-----------( 229      ( 22 May 2024 10:50 )             44.9   05-22    136  |  101  |  13.9  ----------------------------<  115<H>  4.2   |  24.0  |  1.02    Ca    9.2      22 May 2024 10:50  Mg     2.2     05-22    TPro  6.9  /  Alb  4.4  /  TBili  0.6  /  DBili  x   /  AST  25  /  ALT  23  /  AlkPhos  100  05-22  PT/INR - ( 22 May 2024 10:50 )   PT: 10.3 sec;   INR: 0.93 ratio         PTT - ( 22 May 2024 10:50 )  PTT:30.1 sec

## 2024-05-29 ENCOUNTER — TRANSCRIPTION ENCOUNTER (OUTPATIENT)
Age: 69
End: 2024-05-29

## 2024-05-29 ENCOUNTER — OUTPATIENT (OUTPATIENT)
Dept: OUTPATIENT SERVICES | Facility: HOSPITAL | Age: 69
LOS: 1 days | Discharge: HOME | End: 2024-05-29
Payer: MEDICARE

## 2024-05-29 VITALS
DIASTOLIC BLOOD PRESSURE: 96 MMHG | TEMPERATURE: 98 F | HEART RATE: 74 BPM | RESPIRATION RATE: 16 BRPM | OXYGEN SATURATION: 97 % | SYSTOLIC BLOOD PRESSURE: 148 MMHG

## 2024-05-29 VITALS — RESPIRATION RATE: 16 BRPM | HEART RATE: 75 BPM | OXYGEN SATURATION: 96 %

## 2024-05-29 DIAGNOSIS — R94.39 ABNORMAL RESULT OF OTHER CARDIOVASCULAR FUNCTION STUDY: ICD-10-CM

## 2024-05-29 PROCEDURE — C1894: CPT

## 2024-05-29 PROCEDURE — 93458 L HRT ARTERY/VENTRICLE ANGIO: CPT

## 2024-05-29 PROCEDURE — C1769: CPT

## 2024-05-29 PROCEDURE — C1887: CPT

## 2024-05-29 RX ORDER — SODIUM CHLORIDE 9 MG/ML
250 INJECTION INTRAMUSCULAR; INTRAVENOUS; SUBCUTANEOUS
Refills: 0 | Status: DISCONTINUED | OUTPATIENT
Start: 2024-05-29 | End: 2024-06-12

## 2024-05-29 RX ORDER — ASPIRIN/CALCIUM CARB/MAGNESIUM 324 MG
81 TABLET ORAL ONCE
Refills: 0 | Status: COMPLETED | OUTPATIENT
Start: 2024-05-29 | End: 2024-05-29

## 2024-05-29 RX ORDER — ROSUVASTATIN CALCIUM 5 MG/1
1 TABLET ORAL
Refills: 0 | DISCHARGE

## 2024-05-29 RX ORDER — VALSARTAN 80 MG/1
1 TABLET ORAL
Refills: 0 | DISCHARGE

## 2024-05-29 RX ADMIN — Medication 81 MILLIGRAM(S): at 08:08

## 2024-05-29 RX ADMIN — SODIUM CHLORIDE 250 MILLILITER(S): 9 INJECTION INTRAMUSCULAR; INTRAVENOUS; SUBCUTANEOUS at 08:07

## 2024-05-29 NOTE — PROGRESS NOTE ADULT - ASSESSMENT
A/P: 68-year-old white male with known history for CML in remission, GERD, and mild CAD on recent CCTA. He c/o intermittent progressive MACHADO and chest pain with abnormal stress test concerning for apical inferior ischemia. Now present to presurgical testing for upcoming LHC on 5/29 with Dr. Booth to further assess CAD.  Now s/p LHC via RRA with Dr. Booth: normal coronary arteries (preliminary result; official result to follow).    -Bedrest x 1 hour post procedure then OOB  -Remove radial band one hour post procedure  -IVF hydration post procedure as per protocol to prevent ANA  -Continue current med regimen  -Follow up with Dr. Adair in one to two weeks  -Activity instructions discussed with patient verbal understanding  -Discussed with Dr. Booth

## 2024-05-29 NOTE — DISCHARGE NOTE PROVIDER - HOSPITAL COURSE
68-year-old white male with known history for CML in remission, GERD, and mild CAD on recent CCTA. He c/o intermittent progressive MACHADO and chest pain with abnormal stress test concerning for apical inferior ischemia. Now present to presurgical testing for upcoming [facility] on 5/29 with Dr. Booth to further assess CAD.  Now s/p LHC via RRA with Dr. Booth: normal coronary arteries (preliminary result; official result to follow).    -Bedrest x 1 hour post procedure then OOB  -Remove radial band one hour post procedure  -IVF hydration post procedure as per protocol to prevent ANA  -Continue current med regimen  -Follow up with Dr. Adair in one to two weeks  -Activity instructions discussed with patient verbal understanding  -Discussed with Dr. Booth

## 2024-05-29 NOTE — DISCHARGE NOTE PROVIDER - NSDCCPCAREPLAN_GEN_ALL_CORE_FT
PRINCIPAL DISCHARGE DIAGNOSIS  Diagnosis: Chest pain with normal coronary angiography  Assessment and Plan of Treatment: maintain current med regimen

## 2024-05-29 NOTE — PROGRESS NOTE ADULT - SUBJECTIVE AND OBJECTIVE BOX
Interventional Cardiology NP post procedure note:    -s/p LHC via RRA with Dr. Booth: normal coronary arteries (preliminary result; official result to follow)    TELE: NSR    MEDICATIONS  (STANDING):  sodium chloride 0.9%. 250 milliLiter(s) (250 mL/Hr) IV Continuous <Continuous>  sodium chloride 0.9%. 250 milliLiter(s) (250 mL/Hr) IV Continuous <Continuous>      Allergies:  No Known Allergies      PAST MEDICAL & SURGICAL HISTORY:  CML in remission      Hypertension      Mild CAD          Vital Signs Last 24 Hrs  T(C): 36.6 (29 May 2024 07:20), Max: 36.6 (29 May 2024 07:20)  T(F): 97.9 (29 May 2024 07:20), Max: 97.9 (29 May 2024 07:20)  HR: 73 (29 May 2024 08:20) (70 - 75)  BP: 129/93 (29 May 2024 08:20) (129/83 - 148/96)  BP(mean): --  RR: 16 (29 May 2024 08:20) (16 - 16)  SpO2: 95% (29 May 2024 08:20) (95% - 97%)    Parameters below as of 29 May 2024 08:20  Patient On (Oxygen Delivery Method): room air      Physical Exam:  Constitutional: NAD, AAOx3  Cardiovascular: +S1S2 RRR  Pulmonary: CTA b/l, unlabored  GI: soft NTND +BS  Extremities: no pedal edema, +distal pulses b/l  Neuro: non focal, THIBODEAUX x4  Procedure site: Right radial band in place; site benign without hematoma/bleeding; RUE warm/mobile/acyanotic; + right ulnar pulse        RADIOLOGY & ADDITIONAL TESTS:  
Was not done

## 2024-05-29 NOTE — ASU PATIENT PROFILE, ADULT - FALL HARM RISK - UNIVERSAL INTERVENTIONS
Bed in lowest position, wheels locked, appropriate side rails in place/Call bell, personal items and telephone in reach/Instruct patient to call for assistance before getting out of bed or chair/Non-slip footwear when patient is out of bed/[place] to call system/Physically safe environment - no spills, clutter or unnecessary equipment/Purposeful Proactive Rounding/Room/bathroom lighting operational, light cord in reach

## 2024-05-29 NOTE — DISCHARGE NOTE NURSING/CASE MANAGEMENT/SOCIAL WORK - PATIENT PORTAL LINK FT
You can access the FollowMyHealth Patient Portal offered by [facility] by registering at the following website: [email or web address]. By joining [employer]’s FollowMyHealth portal, you will also be able to view your health information using other applications (apps) compatible with our system.

## 2024-05-29 NOTE — DISCHARGE NOTE PROVIDER - CARE PROVIDER_API CALL
Nelson Adair  Cardiology  [address] [place] [address]  Phone: [phone]  Fax: [phone]  Follow Up Time: 1 week

## 2024-05-29 NOTE — DISCHARGE NOTE PROVIDER - NSDCMRMEDTOKEN_GEN_ALL_CORE_FT
Crestor 10 mg oral tablet: 1 tab(s) orally once a day (at bedtime)  valsartan 160 mg oral capsule: 1 cap(s) orally once a day

## 2024-05-29 NOTE — DISCHARGE NOTE PROVIDER - NSDCCPTREATMENT_GEN_ALL_CORE_FT
PRINCIPAL PROCEDURE  Procedure: Left heart cardiac catheterization  Findings and Treatment: Restricted use with no heavy lifting of affected arm for 48 hours.  No submerging the arm in water for 48 hours.  You may start showering today.  Call your doctor for any bleeding, swelling, loss of sensation in the hand or fingers, or fingers turning blue.  If heavy bleeding or large lumps form, hold pressure at the spot and come to the Emergency Room.

## 2024-06-05 PROBLEM — I10 ESSENTIAL (PRIMARY) HYPERTENSION: Chronic | Status: ACTIVE | Noted: 2024-05-22

## 2024-06-05 PROBLEM — I25.10 ATHEROSCLEROTIC HEART DISEASE OF NATIVE CORONARY ARTERY WITHOUT ANGINA PECTORIS: Chronic | Status: ACTIVE | Noted: 2024-05-22

## 2024-06-05 PROBLEM — C92.11 CHRONIC MYELOID LEUKEMIA, BCR/ABL-POSITIVE, IN REMISSION: Chronic | Status: ACTIVE | Noted: 2024-05-22

## 2024-06-10 ENCOUNTER — APPOINTMENT (OUTPATIENT)
Dept: CARDIOLOGY | Facility: CLINIC | Age: 69
End: 2024-06-10
Payer: MEDICARE

## 2024-06-10 VITALS
WEIGHT: 180 LBS | BODY MASS INDEX: 25.77 KG/M2 | SYSTOLIC BLOOD PRESSURE: 142 MMHG | HEIGHT: 70 IN | DIASTOLIC BLOOD PRESSURE: 80 MMHG | RESPIRATION RATE: 16 BRPM

## 2024-06-10 DIAGNOSIS — I38 ENDOCARDITIS, VALVE UNSPECIFIED: ICD-10-CM

## 2024-06-10 DIAGNOSIS — R07.9 CHEST PAIN, UNSPECIFIED: ICD-10-CM

## 2024-06-10 DIAGNOSIS — I10 ESSENTIAL (PRIMARY) HYPERTENSION: ICD-10-CM

## 2024-06-10 DIAGNOSIS — R06.09 OTHER FORMS OF DYSPNEA: ICD-10-CM

## 2024-06-10 PROCEDURE — 93000 ELECTROCARDIOGRAM COMPLETE: CPT

## 2024-06-10 PROCEDURE — 99214 OFFICE O/P EST MOD 30 MIN: CPT

## 2024-06-10 RX ORDER — VALSARTAN 160 MG/1
160 TABLET, COATED ORAL
Qty: 30 | Refills: 0 | Status: ACTIVE | COMMUNITY
Start: 2024-06-10 | End: 1900-01-01

## 2024-06-11 PROBLEM — R06.09 DYSPNEA ON EXERTION: Status: ACTIVE | Noted: 2024-05-03

## 2024-06-11 PROBLEM — I38 MILD VALVULAR HEART DISEASE: Status: ACTIVE | Noted: 2020-10-30

## 2024-06-11 PROBLEM — R07.9 CHEST PAIN SYNDROME: Status: ACTIVE | Noted: 2020-10-30

## 2024-06-11 RX ORDER — ROSUVASTATIN CALCIUM 5 MG/1
5 TABLET, FILM COATED ORAL DAILY
Refills: 0 | Status: ACTIVE | COMMUNITY

## 2024-06-11 NOTE — REASON FOR VISIT
[Follow-Up - Clinic] : a clinic follow-up of [FreeTextEntry1] : The patient is a 68-year-old white male with known history for CML who was previously on Gleevec, and then ultimately switched to Sprycel for a few years and then recently because of evidence of "remission", he was taken off of the Sprycel and just being watched clinically and followed by oncology;  Recently, he was recommended to undergo a CT calcium scoring to rule out any significant risk of coronary artery disease.  The patient had some intermittent chest pain at times in the past; The CT calcium score came out fairly low with a total score of 33.5 in the LAD vessel and 0 on the left main, circumflex, and RCA vessels; (low);  He subsequently had a nuclear stress test performed which was positive for reversible ischemia and recommended to undergo a cardiac catheterization and he's back today for review.  Fortunately, his cardiac catheterization was unremarkable showing NO evidence for coronary disease throughout.     Today, he continues to experience occasional exertional chest discomfort and increased dyspnea on exertion if he really pushes himself. Otherwise, he denies any significant diaphoresis, orthopnea, PND, palpitations or dizziness;

## 2024-06-11 NOTE — REVIEW OF SYSTEMS
[Dyspnea on exertion] : dyspnea during exertion [Chest Discomfort] : chest discomfort [Negative] : Heme/Lymph [FreeTextEntry5] : atypical chest discomfort

## 2024-06-11 NOTE — HISTORY OF PRESENT ILLNESS
[FreeTextEntry1] : He was recommended to start rosuvastatin 10 mg daily with primary care because of the findings on calcium scoring and to lower his serum cholesterol.  He was also started on Valsartan 160 mg daily due to uncontrolled hypertension these last few months.  Admits he's been taking his Valsartan in the P.M. because his PCP instructed him to do so.  Today's blood pressure is slightly elevated at 142/86.    He would like to get back into a regular exercise program and states over the past few years he is slacked off;  Most recent Nuclear Stress Test (4/23/2024):  The myocardial perfusion imaging was abnormal, with small-sized, moderate defect in the apical and apical inferior walls that are reversible consistent with ischemia. There were no ischemic ST segment changes on EKG. The findings are now consistent with ischemia when compared to prior study dated (2019); ie- positive study.    [facility] (5/29/2024): LM: no disease LAD: no disease  Cx: no disease RCA: no disease

## 2024-06-11 NOTE — ASSESSMENT
[FreeTextEntry1] : EKG: MidMark down.   In summary this 68-year-old gentleman with prior history for chest pain in the past that he continues to notice with some worsening MACHADO; echocardiogram ( showing preserved cardiac chamber sizes and normal systolic function of left ventricle and mild AI and TR); Stress test some 5 years ago was negative at that time;  Recently in remission off of medication for CML; Mild systolic hypertension noted today but admits to taking his Valsartan 160 mg in the P.M.  Most recent nuclear stress test positive for reversible ischemia which appears worse than his prior study dated back in (2019).    Subsequently followed up with left heart catheterization (5/29/2024) of which fortunately showed normal coronaries throughout with no significant stenosis reported.      Plan:  Recommend patient begin taking his Valsartan 160 mg once daily in the A.M.  Patient instructed to begin to monitor BP at home approximately 2-3 hours after taking A.M. meds a few times per week.  Purchase a new OMRON series 5.  Contact our office within a couple of weeks if not averaging <140/90.  .   Patient to report any untoward chest symptoms between now and next visit;  Agree with continuing statin medication with watch for LDL cholesterol at levels of 100 or well below if possible;  Continue regular checkups and laboratory blood tests including lipid panel with primary care;  Patient to increase H2O today and be more cognizant of low-sodium diet.   Follow up with our office with Dr. Adair within 5-6 months or PRN.

## 2024-11-24 ENCOUNTER — EMERGENCY (EMERGENCY)
Facility: HOSPITAL | Age: 69
LOS: 1 days | Discharge: HOME | End: 2024-11-24
Attending: EMERGENCY MEDICINE | Admitting: EMERGENCY MEDICINE
Payer: OTHER GOVERNMENT

## 2024-11-24 VITALS
SYSTOLIC BLOOD PRESSURE: 168 MMHG | RESPIRATION RATE: 17 BRPM | TEMPERATURE: 98 F | DIASTOLIC BLOOD PRESSURE: 77 MMHG | OXYGEN SATURATION: 97 % | HEART RATE: 97 BPM | WEIGHT: 175.05 LBS

## 2024-11-24 PROCEDURE — 99053 MED SERV 10PM-8AM 24 HR FAC: CPT

## 2024-11-24 PROCEDURE — 99283 EMERGENCY DEPT VISIT LOW MDM: CPT

## 2024-11-24 NOTE — ED ADULT NURSE NOTE - OBJECTIVE STATEMENT
Patient states fell in [facility] to his knees. Denies head injury. admits to drinking this evening. denies pain

## 2024-11-24 NOTE — ED ADULT TRIAGE NOTE - CHIEF COMPLAINT QUOTE
BIBEMS for AMS, pt arrives awake states he was drinking until the EMS stopped him. Ambulatory without assistance.

## 2024-11-24 NOTE — ED ADULT NURSE NOTE - NSFALLOOBATTEMPT_ED_ALL_ED
Patient is A&O, very hard of hearing, catherine any pain patient requested that he wants to stand up and move around, patient was able to stand with a heave Assist X 2  and gait belt and a walker but unable to move or take a step.    0945 patient was bladder scanned and was retaining > 790 mL, NP aware, an order for starlight cath was obtained, patient was able to use urinal but only had 150 mL, patient was starlight cathed and had 1000 mL out. NP notified.     Patient resting in bed, safety maintained, call light within reach.   No

## 2024-11-25 NOTE — ED PROVIDER NOTE - CLINICAL SUMMARY MEDICAL DECISION MAKING FREE TEXT BOX
69-year-old male with a history of leukemia presenting after mechanical fall from standing while intoxicated.  He does not appear to have any injuries and complains of no pain.  I am not concerned for head injury given that he did not hit his head, lose consciousness, and shows no evidence of head injury.  He has no spine tenderness to warrant spine imaging.  I feel he is safe for discharge home at this time.  He is ambulating with a steady gait.

## 2024-11-25 NOTE — ED PROVIDER NOTE - OBJECTIVE STATEMENT
69-year-old male presenting after he had a mechanical fall while intoxicated.  A bystander called EMS when I saw him fall.  He denies drinking every day.  He denies hitting his head.  He denies any neck or back pain.  He does not take any blood thinners.  He has no other complaints at this time.  He states he lives in [place] and plans on taking an [place] or the train home tonight.

## 2024-11-25 NOTE — ED PROVIDER NOTE - PATIENT PORTAL LINK FT
You can access the FollowMyHealth Patient Portal offered by [facility] by registering at the following website: [email or web address]. By joining [employer]’s FollowMyHealth portal, you will also be able to view your health information using other applications (apps) compatible with our system.

## 2024-11-25 NOTE — ED PROVIDER NOTE - PHYSICAL EXAMINATION
VITAL SIGNS: I have reviewed nursing notes and confirm.  CONSTITUTIONAL: Well-developed; well-nourished; in no acute distress.  HEAD: Normocephalic; atraumatic.  EYES: EOM intact; conjunctiva and sclera clear.  ENT: nose appears normal  NECK: Supple, No midline C-spine tenderness  CARD: S1, S2 normal; no murmurs, gallops, or rubs. Regular rate and rhythm.  RESP: No wheezes, rales or rhonchi.  EXT: Normal ROM. No clubbing, cyanosis or edema.  NEURO: Alert, oriented. Grossly unremarkable.  PSYCH: Cooperative, appropriate.

## 2024-11-27 DIAGNOSIS — F10.129 ALCOHOL ABUSE WITH INTOXICATION, UNSPECIFIED: ICD-10-CM

## 2024-11-27 DIAGNOSIS — Z86.2 PERSONAL HISTORY OF DISEASES OF THE BLOOD AND BLOOD-FORMING ORGANS AND CERTAIN DISORDERS INVOLVING THE IMMUNE MECHANISM: ICD-10-CM

## 2024-12-12 ENCOUNTER — APPOINTMENT (OUTPATIENT)
Dept: CARDIOLOGY | Facility: CLINIC | Age: 69
End: 2024-12-12
Payer: MEDICARE

## 2024-12-12 ENCOUNTER — NON-APPOINTMENT (OUTPATIENT)
Age: 69
End: 2024-12-12

## 2024-12-12 VITALS
DIASTOLIC BLOOD PRESSURE: 74 MMHG | BODY MASS INDEX: 25.05 KG/M2 | RESPIRATION RATE: 16 BRPM | HEART RATE: 68 BPM | WEIGHT: 175 LBS | SYSTOLIC BLOOD PRESSURE: 129 MMHG | HEIGHT: 70 IN

## 2024-12-12 DIAGNOSIS — R07.9 CHEST PAIN, UNSPECIFIED: ICD-10-CM

## 2024-12-12 DIAGNOSIS — I38 ENDOCARDITIS, VALVE UNSPECIFIED: ICD-10-CM

## 2024-12-12 DIAGNOSIS — I10 ESSENTIAL (PRIMARY) HYPERTENSION: ICD-10-CM

## 2024-12-12 PROCEDURE — 99214 OFFICE O/P EST MOD 30 MIN: CPT

## 2024-12-12 PROCEDURE — G2211 COMPLEX E/M VISIT ADD ON: CPT

## 2024-12-12 PROCEDURE — 93000 ELECTROCARDIOGRAM COMPLETE: CPT

## 2025-06-12 ENCOUNTER — APPOINTMENT (OUTPATIENT)
Dept: CARDIOLOGY | Facility: CLINIC | Age: 70
End: 2025-06-12

## 2025-06-12 VITALS
BODY MASS INDEX: 25.62 KG/M2 | RESPIRATION RATE: 16 BRPM | HEART RATE: 81 BPM | WEIGHT: 179 LBS | HEIGHT: 70 IN | DIASTOLIC BLOOD PRESSURE: 84 MMHG | SYSTOLIC BLOOD PRESSURE: 148 MMHG

## 2025-06-12 PROCEDURE — 93000 ELECTROCARDIOGRAM COMPLETE: CPT

## 2025-06-12 PROCEDURE — 99214 OFFICE O/P EST MOD 30 MIN: CPT | Mod: 25

## 2025-06-12 RX ORDER — DASATINIB 140 MG/1
TABLET ORAL
Refills: 0 | Status: ACTIVE | COMMUNITY